# Patient Record
Sex: MALE | NOT HISPANIC OR LATINO | Employment: FULL TIME | URBAN - METROPOLITAN AREA
[De-identification: names, ages, dates, MRNs, and addresses within clinical notes are randomized per-mention and may not be internally consistent; named-entity substitution may affect disease eponyms.]

---

## 2017-11-14 ENCOUNTER — HOSPITAL ENCOUNTER (OUTPATIENT)
Facility: MEDICAL CENTER | Age: 57
End: 2017-11-14
Attending: OPHTHALMOLOGY | Admitting: OPHTHALMOLOGY
Payer: COMMERCIAL

## 2017-11-14 VITALS
RESPIRATION RATE: 16 BRPM | DIASTOLIC BLOOD PRESSURE: 72 MMHG | OXYGEN SATURATION: 100 % | HEART RATE: 58 BPM | TEMPERATURE: 97 F | HEIGHT: 66 IN | SYSTOLIC BLOOD PRESSURE: 115 MMHG | BODY MASS INDEX: 21.97 KG/M2 | WEIGHT: 136.69 LBS

## 2017-11-14 PROCEDURE — 160029 HCHG SURGERY MINUTES - 1ST 30 MINS LEVEL 4: Performed by: OPHTHALMOLOGY

## 2017-11-14 PROCEDURE — 700111 HCHG RX REV CODE 636 W/ 250 OVERRIDE (IP)

## 2017-11-14 PROCEDURE — 160036 HCHG PACU - EA ADDL 30 MINS PHASE I: Performed by: OPHTHALMOLOGY

## 2017-11-14 PROCEDURE — 160002 HCHG RECOVERY MINUTES (STAT): Performed by: OPHTHALMOLOGY

## 2017-11-14 PROCEDURE — 160035 HCHG PACU - 1ST 60 MINS PHASE I: Performed by: OPHTHALMOLOGY

## 2017-11-14 PROCEDURE — 160041 HCHG SURGERY MINUTES - EA ADDL 1 MIN LEVEL 4: Performed by: OPHTHALMOLOGY

## 2017-11-14 PROCEDURE — 500291 HCHG CAUTERY, OPTHTHALMIC: Performed by: OPHTHALMOLOGY

## 2017-11-14 PROCEDURE — 700101 HCHG RX REV CODE 250

## 2017-11-14 PROCEDURE — 160048 HCHG OR STATISTICAL LEVEL 1-5: Performed by: OPHTHALMOLOGY

## 2017-11-14 PROCEDURE — A6410 STERILE EYE PAD: HCPCS | Performed by: OPHTHALMOLOGY

## 2017-11-14 PROCEDURE — 500882 HCHG PACK, EYE CUSTOM CATARACT: Performed by: OPHTHALMOLOGY

## 2017-11-14 PROCEDURE — 500791 HCHG KNIFE, SLIT: Performed by: OPHTHALMOLOGY

## 2017-11-14 PROCEDURE — 500558 HCHG EYE SHIELD W/GARTER (FOX): Performed by: OPHTHALMOLOGY

## 2017-11-14 PROCEDURE — 501425 HCHG SPONGE, WECKCEL SPEAR: Performed by: OPHTHALMOLOGY

## 2017-11-14 PROCEDURE — 502000 HCHG MISC OR IMPLANTS RC 0278: Performed by: OPHTHALMOLOGY

## 2017-11-14 PROCEDURE — 502646 HCHG SEALANT-FIBRIN EVICEL 2 ML: Performed by: OPHTHALMOLOGY

## 2017-11-14 PROCEDURE — 99152 MOD SED SAME PHYS/QHP 5/>YRS: CPT | Performed by: OPHTHALMOLOGY

## 2017-11-14 PROCEDURE — 99153 MOD SED SAME PHYS/QHP EA: CPT | Performed by: OPHTHALMOLOGY

## 2017-11-14 DEVICE — TISSEEL, 2.0ML: Type: IMPLANTABLE DEVICE | Site: EYE | Status: FUNCTIONAL

## 2017-11-14 RX ORDER — MULTIVIT WITH MINERALS/LUTEIN
TABLET ORAL
COMMUNITY

## 2017-11-14 RX ORDER — TETRACAINE HYDROCHLORIDE 5 MG/ML
SOLUTION OPHTHALMIC
Status: DISCONTINUED | OUTPATIENT
Start: 2017-11-14 | End: 2017-11-14 | Stop reason: HOSPADM

## 2017-11-14 RX ORDER — LIDOCAINE HCL/EPINEPHRINE/PF 2%-1:200K
VIAL (ML) INJECTION
Status: DISCONTINUED
Start: 2017-11-14 | End: 2017-11-14 | Stop reason: HOSPADM

## 2017-11-14 RX ORDER — TETRACAINE HYDROCHLORIDE 5 MG/ML
SOLUTION OPHTHALMIC
Status: COMPLETED
Start: 2017-11-14 | End: 2017-11-14

## 2017-11-14 RX ORDER — LIDOCAINE HCL/EPINEPHRINE/PF 2%-1:200K
VIAL (ML) INJECTION
Status: DISCONTINUED | OUTPATIENT
Start: 2017-11-14 | End: 2017-11-14 | Stop reason: HOSPADM

## 2017-11-14 RX ORDER — SODIUM CHLORIDE, SODIUM LACTATE, POTASSIUM CHLORIDE, CALCIUM CHLORIDE 600; 310; 30; 20 MG/100ML; MG/100ML; MG/100ML; MG/100ML
INJECTION, SOLUTION INTRAVENOUS CONTINUOUS
Status: DISCONTINUED | OUTPATIENT
Start: 2017-11-14 | End: 2017-11-14 | Stop reason: HOSPADM

## 2017-11-14 RX ADMIN — SODIUM CHLORIDE, SODIUM LACTATE, POTASSIUM CHLORIDE, CALCIUM CHLORIDE: 600; 310; 30; 20 INJECTION, SOLUTION INTRAVENOUS at 09:40

## 2017-11-14 RX ADMIN — TETRACAINE HYDROCHLORIDE 3 DROP: 5 SOLUTION OPHTHALMIC at 09:20

## 2017-11-14 ASSESSMENT — PAIN SCALES - GENERAL
PAINLEVEL_OUTOF10: 0

## 2017-11-14 NOTE — OR NURSING
1141 PT ADM TO pacu - REPORT RECEIVED FROM RN AND ANESTHESIA - PT DENIES PAIN OR NAUSEA, OFFERED JUICE AND CRACKERS, LEFT EYE - DSD WITH EYE SHIELD ON - AMOS      *** discharged instructions reviewed with pt and marla.  All questions and concerns addressed    *** pt discharged to private car, ambulatory acc. By daughter

## 2017-11-14 NOTE — DISCHARGE INSTRUCTIONS
ACTIVITY: Rest and take it easy for the first 24 hours.  A responsible adult is recommended to remain with you during that time.  It is normal to feel sleepy.  We encourage you to not do anything that requires balance, judgment or coordination.    MILD FLU-LIKE SYMPTOMS ARE NORMAL. YOU MAY EXPERIENCE GENERALIZED MUSCLE ACHES, THROAT IRRITATION, HEADACHE AND/OR SOME NAUSEA.    FOR 24 HOURS DO NOT:  Drive, operate machinery or run household appliances.  Drink beer or alcoholic beverages.   Make important decisions or sign legal documents.    SPECIAL INSTRUCTIONS: PER MD'S INSTRUCTIONS - NO BENDING, STRAINING, STOOPING OR LIFTING  DO NOT USE ANY DROPS UNTIL OK'ED BY MD  DIET: To avoid nausea, slowly advance diet as tolerated, avoiding spicy or greasy foods for the first day.  Add more substantial food to your diet according to your physician's instructions.  Babies can be fed formula or breast milk as soon as they are hungry.  INCREASE FLUIDS AND FIBER TO AVOID CONSTIPATION.    SURGICAL DRESSING/BATHING: PER MD'S INSTRUCTIONS - KEEP THE DRESSINGS CLEAN AND DRY UNTIL SEEN BY MD    FOLLOW-UP APPOINTMENT:  A follow-up appointment should be arranged with your doctor in TOMORROW; call to schedule.    You should CALL YOUR PHYSICIAN if you develop:  Fever greater than 101 degrees F.  Pain not relieved by medication, or persistent nausea or vomiting.  Excessive bleeding (blood soaking through dressing) or unexpected drainage from the wound.  Extreme redness or swelling around the incision site, drainage of pus or foul smelling drainage.  Inability to urinate or empty your bladder within 8 hours.  Problems with breathing or chest pain.    You should call 911 if you develop problems with breathing or chest pain.  If you are unable to contact your doctor or surgical center, you should go to the nearest emergency room or urgent care center.  Physician's telephone #: [phone]    If any questions arise, call your doctor.  If your  doctor is not available, please feel free to call the Surgical Center at [phone].  The Center is open Monday through Friday from 7AM to 7PM.  You can also call the HEALTH HOTLINE open 24 hours/day, 7 days/week and speak to a nurse at [phone], or toll free at [phone].    A registered nurse may call you a few days after your surgery to see how you are doing after your procedure.    MEDICATIONS: Resume taking daily medication.  Take prescribed pain medication with food.  If no medication is prescribed, you may take non-aspirin pain medication if needed.  PAIN MEDICATION CAN BE VERY CONSTIPATING.  Take a stool softener or laxative such as senokot, pericolace, or milk of magnesia if needed.    Prescription given for VICODIN.  Last pain medication given at NA.    If your physician has prescribed pain medication that includes Acetaminophen (Tylenol), do not take additional Acetaminophen (Tylenol) while taking the prescribed medication.    Depression / Suicide Risk    As you are discharged from this [facility] facility, it is important to learn how to keep safe from harming yourself.    Recognize the warning signs:  · Abrupt changes in personality, positive or negative- including increase in energy   · Giving away possessions  · Change in eating patterns- significant weight changes-  positive or negative  · Change in sleeping patterns- unable to sleep or sleeping all the time   · Unwillingness or inability to communicate  · Depression  · Unusual sadness, discouragement and loneliness  · Talk of wanting to die  · Neglect of personal appearance   · Rebelliousness- reckless behavior  · Withdrawal from people/activities they love  · Confusion- inability to concentrate     If you or a loved one observes any of these behaviors or has concerns about self-harm, here's what you can do:  · Talk about it- your feelings and reasons for harming yourself  · Remove any means that you might use to hurt yourself  (examples: pills, rope, extension cords, firearm)  · Get professional help from the community (Mental Health, Substance Abuse, psychological counseling)  · Do not be alone:Call your Safe Contact- someone whom you trust who will be there for you.  · Call your local CRISIS HOTLINE 318-5005 or 534-463-8531  · Call your local Children's Mobile Crisis Response Team [place] (224) 018-2987 or www.[employer]  · Call the toll free National Suicide Prevention Hotlines   · National Suicide Prevention Lifeline 556-841-LDSG (6852)  · National Hope Line Network 800-SUICIDE (089-7997)

## 2017-11-15 NOTE — OP REPORT
DATE OF SERVICE:  11/14/2017    PROCEDURE PERFORMED:  Pterygium excision with the placement of amniotic   membrane graft, left eye.    PREOPERATIVE DIAGNOSIS:  Left eye pterygium.    POSTOPERATIVE DIAGNOSIS:  Left eye pterygium.    INDICATIONS FOR SURGERY:  This is a patient with a visually significant large   nasal left eye pterygium requiring excision for improvement in the visual   function.    ANESTHESIA:  Topical and monitored.    COMPLICATIONS:  None.    BLOOD LOSS:  Minimal.    SPECIMENS:  None.    DESCRIPTION OF PROCEDURE:  Risks, benefits, alternatives and indications for   surgery were reviewed with the patient preoperatively, and informed consent   was obtained.  Patient was brought to the operating room where the left eye   was prepped and draped and a lid speculum placed.  A large nasal pterygium was   observed and superior 6-0 Vicryl traction suture placed at the limbus and the   eye rotated outwardly.  The pterygium was injected with lidocaine and   epinephrine and then removed in total using Heath scissors and 0.12 forceps   down to bare sclera.  Hemostasis of the scleral bed was achieved using light   cautery.  The head of the pterygium was removed from the cornea using a   crescent blade as well as a rotating shakira dusted bur to smooth the nasal   peripheral and mid peripheral cornea.  Attention was then brought to the   amniotic membrane tissue, which was trimmed to the appropriate size, slid from   its base and then fixated on the bare scleral defect using Tisseel fibrin   glue.  The edges of the graft were tucked underneath native conjunctiva, which   was also reapproximated using additional fibrin glue as well as 2 interrupted   8-0 Vicryl sutures.  At the end of the case, the patient was given lidocaine   gel and TobraDex ointment and the eye firmly patched and shielded and the   patient discharged to the postanesthesia care unit in stable condition.        ____________________________________     MD VANNESA Soni / MONTRELL    DD:  11/14/2017 16:36:48  DT:  11/14/2017 16:51:28    D#:  [id number]  Job#:  [id number]

## 2018-02-26 ENCOUNTER — NON-PROVIDER VISIT (OUTPATIENT)
Dept: URGENT CARE | Facility: PHYSICIAN GROUP | Age: 58
End: 2018-02-26

## 2018-02-26 DIAGNOSIS — Z02.1 PRE-EMPLOYMENT DRUG SCREENING: ICD-10-CM

## 2018-02-26 LAB
AMP AMPHETAMINE: NEGATIVE
COC COCAINE: NEGATIVE
INT CON NEG: NEGATIVE
INT CON POS: POSITIVE
MET METHAMPHETAMINES: NEGATIVE
OPI OPIATES: NEGATIVE
PCP PHENCYCLIDINE: NEGATIVE
POC DRUG COMMENT 753798-OCCUPATIONAL HEALTH: NORMAL
THC: NEGATIVE

## 2018-02-26 PROCEDURE — 80305 DRUG TEST PRSMV DIR OPT OBS: CPT | Performed by: PHYSICIAN ASSISTANT

## 2018-09-27 ENCOUNTER — NON-PROVIDER VISIT (OUTPATIENT)
Dept: URGENT CARE | Facility: PHYSICIAN GROUP | Age: 58
End: 2018-09-27

## 2018-09-27 DIAGNOSIS — Z02.1 PRE-EMPLOYMENT DRUG SCREENING: ICD-10-CM

## 2018-09-27 LAB
AMP AMPHETAMINE: NORMAL
COC COCAINE: NORMAL
INT CON NEG: NORMAL
INT CON POS: NORMAL
MET METHAMPHETAMINES: NORMAL
OPI OPIATES: NORMAL
PCP PHENCYCLIDINE: NORMAL
POC DRUG COMMENT 753798-OCCUPATIONAL HEALTH: NEGATIVE
THC: NORMAL

## 2018-09-27 PROCEDURE — 80305 DRUG TEST PRSMV DIR OPT OBS: CPT | Performed by: PHYSICIAN ASSISTANT

## 2018-12-03 ENCOUNTER — OCCUPATIONAL MEDICINE (OUTPATIENT)
Dept: URGENT CARE | Facility: PHYSICIAN GROUP | Age: 58
End: 2018-12-03
Payer: OTHER MISCELLANEOUS

## 2018-12-03 VITALS
DIASTOLIC BLOOD PRESSURE: 74 MMHG | HEART RATE: 94 BPM | BODY MASS INDEX: 22.5 KG/M2 | SYSTOLIC BLOOD PRESSURE: 120 MMHG | WEIGHT: 140 LBS | HEIGHT: 66 IN | TEMPERATURE: 97.6 F | OXYGEN SATURATION: 97 %

## 2018-12-03 DIAGNOSIS — S46.912A STRAIN OF LEFT SHOULDER, INITIAL ENCOUNTER: ICD-10-CM

## 2018-12-03 DIAGNOSIS — S56.912A FOREARM STRAIN, LEFT, INITIAL ENCOUNTER: ICD-10-CM

## 2018-12-03 DIAGNOSIS — S50.812A ABRASION OF LEFT FOREARM, INITIAL ENCOUNTER: ICD-10-CM

## 2018-12-03 DIAGNOSIS — Z02.1 PRE-EMPLOYMENT DRUG SCREENING: ICD-10-CM

## 2018-12-03 LAB
AMP AMPHETAMINE: NORMAL
BREATH ALCOHOL COMMENT: NORMAL
COC COCAINE: NORMAL
INT CON NEG: NEGATIVE
INT CON POS: POSITIVE
MET METHAMPHETAMINES: NORMAL
OPI OPIATES: NORMAL
PCP PHENCYCLIDINE: NORMAL
POC BREATHALIZER: 0 PERCENT (ref 0–0.01)
POC DRUG COMMENT 753798-OCCUPATIONAL HEALTH: NEGATIVE
THC: NORMAL

## 2018-12-03 PROCEDURE — 80305 DRUG TEST PRSMV DIR OPT OBS: CPT | Performed by: NURSE PRACTITIONER

## 2018-12-03 PROCEDURE — 99204 OFFICE O/P NEW MOD 45 MIN: CPT | Mod: 29 | Performed by: NURSE PRACTITIONER

## 2018-12-03 PROCEDURE — 82075 ASSAY OF BREATH ETHANOL: CPT | Performed by: NURSE PRACTITIONER

## 2018-12-03 RX ORDER — IBUPROFEN 200 MG
200 TABLET ORAL EVERY 6 HOURS PRN
COMMUNITY

## 2018-12-03 ASSESSMENT — ENCOUNTER SYMPTOMS
WEAKNESS: 0
FEVER: 0
MUSCULOSKELETAL PAIN: 1
DIZZINESS: 0
NUMBNESS: 1
SORE THROAT: 0
JOINT SWELLING: 0
CHILLS: 0
MYALGIAS: 1
SHORTNESS OF BREATH: 0
NAUSEA: 0
EYE PAIN: 0
VOMITING: 0
FATIGUE: 0

## 2018-12-03 NOTE — LETTER
"EMPLOYEE’S CLAIM FOR COMPENSATION/ REPORT OF INITIAL TREATMENT  FORM C-4    EMPLOYEE’S CLAIM - PROVIDE ALL INFORMATION REQUESTED   First Name  Karine Last Name   Birthdate                    1960                Sex  male Claim Number   Home Address  [address]  Age  58 y.o. Height  1.676 m (5' 6\") Weight  63.5 kg (140 lb) [facility]     [place] Zip  [address] Telephone  [phone] (home)    Mailing Address  [address]  [place]  [address] Primary Language Spoken  English    Insurer Third Party   [employer]    Employee's Occupation (Job Title) When Injury or Occupational Disease Occurred  PRODUCTION     Employer's Name  [employer]  Telephone  [phone]    Employer Address  [address]  [place]  Zip  [address]    Date of Injury  11/30/2018               Hour of Injury  2:50 PM Date Employer Notified  12/3/2018 Last Day of Work after Injury or Occupational Disease  12/3/2018 Supervisor to Whom Injury Reported  DYLLAN   Address or Location of Accident (if applicable)  [[place]]   What were you doing at the time of accident? (if applicable)  I WAS CUTTING PLASTIC    How did this injury or occupational disease occur? (Be specific an answer in detail. Use additional sheet if necessary)  WHEN I WAS CUTTING PLASTIC   If you believe that you have an occupational disease, when did you first have knowledge of the disability and it relationship to your employment?  NA Witnesses to the Accident  DYLLAN       Nature of Injury or Occupational Disease  Laceration  Part(s) of Body Injured or Affected  Lower Arm (L), ,     I certify that the above is true and correct to the best of my knowledge and that I have provided this information in order to obtain the benefits of [place] " inclusive or [place]).  I hereby authorize any physician, chiropractor, surgeon, practitioner, or other person, any hospital, including [facility] hospital, any medical service organization, any insurance company, or other institution or organization to release to each other, any medical or other information, including benefits paid or payable, pertinent to this injury or disease, except information relative to diagnosis, treatment and/or counseling for AIDS, psychological conditions, alcohol or controlled substances, for which I must give specific authorization.  A Photostat of this authorization shall be as valid as the original.     Date 12/3/18   Place [facility]    Employee’s Signature   THIS REPORT MUST BE COMPLETED AND MAILED WITHIN 3 WORKING DAYS OF TREATMENT   Place  [facility] [place]  Name of Facility  [place]   Date  12/3/2018 Diagnosis  (S56.912A) Forearm strain, left, initial encounter  (S50.812A) Abrasion of left forearm, initial encounter  (S46.912A) Strain of left shoulder, initial encounter Is there evidence the injured employee was under the influence of alcohol and/or another controlled substance at the time of accident?   Hour  9:13 AM Description of Injury or Disease  Diagnoses of Forearm strain, left, initial encounter, Abrasion of left forearm, initial encounter, and Strain of left shoulder, initial encounter were pertinent to this visit. No   Treatment  Recommended relative rest, ice, nsaid prn.  Patient requesting topical ointment.  Will place patient on temporary work restrictions with limited use of the left arm, no fine hand manipulation,gripping,  weight limit of less than 10 pounds.  We will see patient back in 4 days for reevaluation.  Have you advised the patient to remain off work five days or more? No   X-Ray Findings      If Yes   From Date  To Date      From information given by the employee, together with medical  "evidence, can you directly connect this injury or occupational disease as job incurred?  Yes If No Full Duty  No Modified Duty  Yes   Is additional medical care by a physician indicated?  Yes If Modified Duty, Specify any Limitations / Restrictions  limited use of the left arm, no fine hand manipulation,gripping,  weight limit of less than 10 pounds   Do you know of any previous injury or disease contributing to this condition or occupational disease?                            No   Date  12/3/2018 Print Doctor’s Name HARISH Joseph I certify the employer’s copy of  this form was mailed on:   Address  [address] [place] [address]. [address] Insurer’s Use Only     [facility] Zip  [address]    Provider’s Tax ID Number  [id number] Telephone  Dept: [phone]        glenn-ELMER Fisher   e-Signature: Dr. Hong Escobar, Medical Director Degree  APRN        ORIGINAL-TREATING PHYSICIAN OR CHIROPRACTOR    PAGE 2-INSURER/TPA    PAGE 3-EMPLOYER    PAGE 4-EMPLOYEE             Form C-4 (rev10/07)              BRIEF DESCRIPTION OF RIGHTS AND BENEFITS  (Pursuant to [place]RS 616C.050)    Notice of Injury or Occupational Disease (Incident Report Form C-1): If an injury or occupational disease (OD) arises out of and in the  course of employment, you must provide written notice to your employer as soon as practicable, but no later than 7 days after the accident or  OD. Your employer shall maintain a sufficient supply of the required forms.    Claim for Compensation (Form C-4): If medical treatment is sought, the form C-4 is available at the place of initial treatment. A completed  \"Claim for Compensation\" (Form C-4) must be filed within 90 days after an accident or OD. The treating physician or chiropractor must,  within 3 working days after treatment, complete and mail to the employer, the employer's insurer and third-party , the Claim for  Compensation.    Medical Treatment: If you require " medical treatment for your on-the-job injury or OD, you may be required to select a physician or  chiropractor from a list provided by your workers’ compensation insurer, if it has contracted with an Organization for Managed Care (MCO) or  Preferred Provider Organization (PPO) or providers of health care. If your employer has not entered into a contract with an MCO or PPO, you  may select a physician or chiropractor from the Panel of Physicians and Chiropractors. Any medical costs related to your industrial injury or  OD will be paid by your insurer.    Temporary Total Disability (TTD): If your doctor has certified that you are unable to work for a period of at least 5 consecutive days, or 5  cumulative days in a 20-day period, or places restrictions on you that your employer does not accommodate, you may be entitled to TTD  compensation.    Temporary Partial Disability (TPD): If the wage you receive upon reemployment is less than the compensation for TTD to which you are  entitled, the insurer may be required to pay you TPD compensation to make up the difference. TPD can only be paid for a maximum of 24  months.    Permanent Partial Disability (PPD): When your medical condition is stable and there is an indication of a PPD as a result of your injury or  OD, within 30 days, your insurer must arrange for an evaluation by a rating physician or chiropractor to determine the degree of your PPD. The  amount of your PPD award depends on the date of injury, the results of the PPD evaluation and your age and wage.    Permanent Total Disability (PTD): If you are medically certified by a treating physician or chiropractor as permanently and totally disabled  and have been granted a PTD status by your insurer, you are entitled to receive monthly benefits not to exceed 66 2/3% of your average  monthly wage. The amount of your PTD payments is subject to reduction if you previously received a PPD award.    Vocational  Rehabilitation Services: You may be eligible for vocational rehabilitation services if you are unable to return to the job due to a  permanent physical impairment or permanent restrictions as a result of your injury or occupational disease.    Transportation and Per Rose Reimbursement: You may be eligible for travel expenses and per rose associated with medical treatment.    Reopening: You may be able to reopen your claim if your condition worsens after claim closure.    Appeal Process: If you disagree with a written determination issued by the insurer or the insurer does not respond to your request, you may  appeal to the Department of Administration, , by following the instructions contained in your determination letter. You must  appeal the determination within 70 days from the date of the determination letter at [address], [address], [place], [place]  [address], or [address], [place], [place] [address]. If you disagree with the  decision, you may appeal to the  Department of Administration, . You must file your appeal within 30 days from the date of the  decision  letter at [address], [address], [place], [place] [address], or [address], [place], [place] [address]. If you  disagree with a decision of an , you may file a petition for judicial review with the District Court. You must do so within 30  days of the Appeal Officer’s decision. You may be represented by an  at your own expense or you may contact the [facility] for possible  representation.    [place]  for Injured Workers (NAIW): If you disagree with a  decision, you may request that NAIW represent you  without charge at an  Hearing. For information regarding denial of benefits, you may contact the [facility] at: [address], [address], [place] [address], [phone],  or [address], [place] [address], [phone]    To File a Complaint with the Division: If you wish to file a complaint with the  of the Division of Industrial Relations (DIR),  please contact the Workers’ Compensation Section, [address], [address], [place], [place] [address], telephone [phone]) [phone], or  [address], [address], [place], [place] [address], telephone [phone].    For assistance with Workers’ Compensation Issues: you may contact the Office of the Governor Consumer Health Assistance, [address] [place] [address], [address], [place], [place] [address], Toll Free 1-570.565.2903, Web site: http://govcha.[facility].nv., E-mail  [email or web address].nv.                                                                                                                                                                                                                                   __________________________________________________________________                                                                   _________________                Employee Name / Signature                                                                                                                                                       Date                                                                                                                                                                                                     D-2 (rev. 10/07)

## 2018-12-03 NOTE — LETTER
[facility]  [address] - [place] [address]  Phone:  [phone] - Fax:  [phone]   [facility] Progress Report and Disability Certification  Date of Service: 12/3/2018   No Show:  No  Date / Time of Next Visit: 12/7/2018   Claim Information   Patient Name: Karine Anglin  Claim Number:     Employer: [employer]  Date of Injury: 11/30/2018     Insurer / TPA: [employer] Workers [employer] - Tpa  ID / SSN:     Occupation: PRODUCTION   Diagnosis: Diagnoses of Forearm strain, left, initial encounter, Abrasion of left forearm, initial encounter, and Strain of left shoulder, initial encounter were pertinent to this visit.    Medical Information   Related to Industrial Injury? Yes    Subjective Complaints:  DOI 11/30/18: Patient was cutting plastic with a knife and pulled very hard to cut and strained his left forearm and left shoulder.  Pain is worst at night if he is unable to sleep.  At this time he does not have any pain in the left shoulder.  He does have pain of the left forearm with tenderness on palpation.  He his having some numbness of left hand that is intermittent. He has been taking advil, and rubbing topical ointment with relief in symptoms.  He denies any previous injury to the left arm.  Denies second job.   Objective Findings: LEFT shoulder: FROM, no tenderness to palpation of anterior or posterior.  Rotator cuff appears to be intact.  Small superficial abrasion to left forearm, mild tenderness to palpation.  No swelling, no bruising.   strength 5/5. Sensation intact distally, Finkelstien negative.  To tenderness of lateral or medial epicondyles.  A&Ox4.    Pre-Existing Condition(s):     Assessment:   Initial Visit    Status: Additional Care Required  Permanent Disability:No    Plan: Medication    Diagnostics:      Comments:       Disability Information   Status: Released to Restricted Duty    From:  12/3/2018  Through:  2018 Restrictions are: Temporary   Physical Restrictions   Sitting:    Standing:    Stooping:    Bending:      Squatting:    Walking:    Climbing:    Pushing:      Pullin hrs/day Other:    Reaching Above Shoulder (L):   Reaching Above Shoulder (R):       Reaching Below Shoulder (L):    Reaching Below Shoulder (R):      Not to exceed Weight Limits   Carrying(hrs):   Weight Limit(lb): < or = to 10 pounds Lifting(hrs):   Weight  Limit(lb): < or = to 10 pounds   Comments: Recommended relative rest, ice, nsaid prn.  Patient requesting topical ointment.  Will place patient on temporary work restrictions with limited use of the left arm, no  fine hand manipulation, gripping, weight limit of less than 10 pounds.  We will see patient back in 4 days for reevaluation.      Repetitive Actions   Hands: i.e. Fine Manipulations from [place] hrs/day   Feet: i.e. Operating Foot Controls:     Driving / Operate Machinery:     Physician Name: HARISH Joseph Physician Signature: ELMER Peoples e-Signature: Dr. Hong Escobar, Medical Director   Clinic Name / Location: [address]  [place] [address] Clinic Phone Number: Dept: [phone]   Appointment Time: 8:55 Am Visit Start Time: 9:13 AM   Check-In Time:  8:59 Am Visit Discharge Time:  9:59 AM   Original-Treating Physician or Chiropractor    Page 2-Insurer/TPA    Page 3-Employer    Page 4-Employee

## 2018-12-03 NOTE — PROGRESS NOTES
Subjective:     Karine Anglin is a 58 y.o. male who presents for Muscle Pain (Lt arm, sharp pain that radiates to shoulder for 3 days. Small scrap to Lt  forearm. Pt states he have pulling and cutting heavy plastic when he cut his arm and strained hard to pull the plastic. )    DOI 11/30/18: Patient was cutting plastic with a knife and pulled very hard to cut and strained his left forearm and left shoulder.  Pain is worst at night if he is unable to sleep.  At this time he does not have any pain in the left shoulder.  He does have pain of the left forearm with tenderness on palpation.  He his having some numbness of left hand that is intermittent. He has been taking advil, and rubbing topical ointment with relief in symptoms.  He denies any previous injury to the left arm.  Denies second job.  Muscle Pain   This is a new problem. Episode onset: 4 days. The problem occurs constantly. The problem has been unchanged. Associated symptoms include myalgias (Left arm) and numbness (Intermittent). Pertinent negatives include no chest pain, chills, fatigue, fever, joint swelling, nausea, rash, sore throat, vomiting or weakness. Exacerbated by: Movement, lifting. He has tried NSAIDs and rest for the symptoms. The treatment provided mild relief.     Past Medical History:   Diagnosis Date   • Cataract     Left eye   • Urinary incontinence      Past Surgical History:   Procedure Laterality Date   • PTERYGIUM EXCISION Left 11/14/2017    Procedure: PTERYGIUM EXCISION W/AMNIOTIC MEMBRANE GRAFT;  Surgeon: Steve Hollins M.D.;  Location: SURGERY SAME DAY [facility];  Service: Ophthalmology   • OTHER      Right eye-pteryguim     Social History     Social History   • Marital status:      Spouse name: N/A   • Number of children: N/A   • Years of education: N/A     Occupational History   • Not on file.     Social History Main Topics   • Smoking status: Never Smoker   • Smokeless tobacco: Never Used   • Alcohol use No  "  • Drug use: No   • Sexual activity: Not on file     Other Topics Concern   • Not on file     Social History Narrative   • No narrative on file    History reviewed. No pertinent family history. Review of Systems   Constitutional: Negative for chills, fatigue and fever.   HENT: Negative for sore throat.    Eyes: Negative for pain.   Respiratory: Negative for shortness of breath.    Cardiovascular: Negative for chest pain.   Gastrointestinal: Negative for nausea and vomiting.   Genitourinary: Negative for hematuria.   Musculoskeletal: Positive for myalgias (Left arm). Negative for joint pain and joint swelling.   Skin: Negative for rash.   Neurological: Positive for numbness (Intermittent). Negative for dizziness and weakness.   No Known Allergies   Objective:   /74 (BP Location: Right arm, Patient Position: Sitting, BP Cuff Size: Adult)   Pulse 94   Temp 36.4 °C (97.6 °F) (Temporal)   Ht 1.676 m (5' 6\")   Wt 63.5 kg (140 lb)   SpO2 97%   BMI 22.60 kg/m²   Physical Exam   Constitutional: He is oriented to person, place, and time. He appears well-developed and well-nourished. No distress.   HENT:   Head: Normocephalic and atraumatic.   Eyes: Pupils are equal, round, and reactive to light. Conjunctivae and EOM are normal.   Cardiovascular: Normal rate and regular rhythm.    No murmur heard.  Pulmonary/Chest: Effort normal and breath sounds normal. No respiratory distress.   Abdominal: Soft. He exhibits no distension. There is no tenderness.   Musculoskeletal:        Left shoulder: He exhibits normal range of motion, no tenderness, no pain and no spasm.        Left elbow: He exhibits normal range of motion and no swelling. No tenderness found. No medial epicondyle and no lateral epicondyle tenderness noted.        Left forearm: He exhibits tenderness. He exhibits no bony tenderness, no swelling and no laceration.        Arms:  Neurological: He is alert and oriented to person, place, and time. He has normal " reflexes. No sensory deficit.   Skin: Skin is warm and dry.   Psychiatric: He has a normal mood and affect.     LEFT shoulder: FROM, no tenderness to palpation of anterior or posterior.  Rotator cuff appears to be intact.  Small superficial abrasion to left forearm, mild tenderness to palpation.  No swelling, no bruising.   strength 5/5. Sensation intact distally, Finkelstien negative.  To tenderness of lateral or medial epicondyles.  A&Ox4.    Assessment/Plan:   Assessment    1. Forearm strain, left, initial encounter  Diclofenac Sodium 1 % Gel   2. Abrasion of left forearm, initial encounter     3. Strain of left shoulder, initial encounter  Diclofenac Sodium 1 % Gel   Recommended relative rest, ice, nsaid prn.  Patient requesting topical ointment.  Will place patient on temporary work restrictions with limited use of the left arm, no  fine hand manipulation, gripping, weight limit of less than 10 pounds.  We will see patient back in 4 days for reevaluation.  Differential diagnosis, natural history, supportive care, and indications for immediate follow-up discussed.

## 2018-12-07 ENCOUNTER — OCCUPATIONAL MEDICINE (OUTPATIENT)
Dept: URGENT CARE | Facility: PHYSICIAN GROUP | Age: 58
End: 2018-12-07
Payer: OTHER MISCELLANEOUS

## 2018-12-07 VITALS
SYSTOLIC BLOOD PRESSURE: 122 MMHG | HEART RATE: 69 BPM | OXYGEN SATURATION: 97 % | TEMPERATURE: 98.6 F | WEIGHT: 142.8 LBS | HEIGHT: 66 IN | DIASTOLIC BLOOD PRESSURE: 76 MMHG | BODY MASS INDEX: 22.95 KG/M2

## 2018-12-07 DIAGNOSIS — S46.912D MUSCLE STRAIN OF LEFT UPPER ARM, SUBSEQUENT ENCOUNTER: ICD-10-CM

## 2018-12-07 DIAGNOSIS — S56.912D FOREARM STRAIN, LEFT, SUBSEQUENT ENCOUNTER: ICD-10-CM

## 2018-12-07 PROCEDURE — 99213 OFFICE O/P EST LOW 20 MIN: CPT | Mod: 29 | Performed by: NURSE PRACTITIONER

## 2018-12-07 ASSESSMENT — ENCOUNTER SYMPTOMS
CHILLS: 0
MYALGIAS: 1
TINGLING: 0
BRUISES/BLEEDS EASILY: 0
WEAKNESS: 0
FEVER: 0
SENSORY CHANGE: 0

## 2018-12-07 NOTE — LETTER
[facility]  [address] [place] [address]. [address] - [place] [address]  Phone:  [phone] - Fax:  [phone]   [facility] Progress Report and Disability Certification  Date of Service: 12/7/2018   No Show:  No  Date / Time of Next Visit: 12/14/2018   Claim Information   Patient Name: Karine Anglin  Claim Number:     Employer: [employer]  Date of Injury: 11/30/2018     Insurer / TPA: [employer] Workers [employer] - Tpa  ID / SSN:     Occupation: PRODUCTION   Diagnosis: Diagnoses of Forearm strain, left, subsequent encounter and Muscle strain of left upper arm, subsequent encounter were pertinent to this visit.    Medical Information   Related to Industrial Injury? Yes    Subjective Complaints:  DOI 11/30/18. Strained left forearm when he was cutting plastic with knife. Abrasion to left forearm healed well. No c/o left shoulder pain. C/o of mild upper bicep pain especially with cold temperatures, states will go into steam shower at gym and muscle pain will go away. Taking Advil only at bed time after work. Denies numbness.tingling in left hand/arm. Denies weakness in left arm. States never received Diclofenac gel from previous visit.   Objective Findings: A/O x 4. Skin p/w/d, skin sensation intact. Well healed abrasion to left forearm. No TTP at this site. Equal arm strength. No muscle tenderness at bicep. No swelling, muscle bulging or bruising seen. FROM of elbow and shoulder joints.    Pre-Existing Condition(s):     Assessment:   Condition Improved    Status: Additional Care Required  Permanent Disability:No    Plan:   Comments:Diclofenac called into [facility] pharmacy during exam today.    Diagnostics:      Comments:       Disability Information   Status: Released to Restricted Duty    From:  12/7/2018  Through: 12/14/2018 Restrictions are: Temporary   Physical Restrictions   Sitting:    Standing:    Stooping:    Bending:      Squatting:    Walking:   Climbing:    Pushing:      Pulling:    Other:    Reaching Above Shoulder (L):   Reaching Above Shoulder (R):       Reaching Below Shoulder (L):    Reaching Below Shoulder (R):      Not to exceed Weight Limits   Carrying(hrs):   Weight Limit(lb): < or = to 10 pounds Lifting(hrs):   Weight  Limit(lb): < or = to 10 pounds   Comments: May continue Advil at bedtime prn, may use Diclofenac as directed to affected arm region, may continue to use steam as muscle relaxant. Recheck on 12/14/18.    Repetitive Actions   Hands: i.e. Fine Manipulations from Grasping:     Feet: i.e. Operating Foot Controls:     Driving / Operate Machinery:     Physician Name: ANN Vega Physician Signature: NUPUR Farrell e-Signature: Dr. Hong Escobar, Medical Director   Clinic Name / Location: [address]  [place] [address] Clinic Phone Number: Dept: [phone]   Appointment Time: 4:00 Pm Visit Start Time: 4:14 PM   Check-In Time:  3:54 Pm Visit Discharge Time:  5:28 PM    Original-Treating Physician or Chiropractor    Page 2-Insurer/TPA    Page 3-Employer    Page 4-Employee

## 2018-12-08 NOTE — PROGRESS NOTES
Subjective:      Karine Anglin is a 58 y.o. male who presents with Arm Injury ((laceration) workman's comp follow up. Pt states that they feel a little better, still minor pain present.)      DOI 11/30/18. Strained left forearm when he was cutting plastic with knife. Abrasion to left forearm healed well. No c/o left shoulder pain. C/o of mild upper bicep pain especially with cold temperatures, states will go into steam shower at gym and muscle pain will go away. Taking Advil only at bed time after work. Denies numbness.tingling in left hand/arm. Denies weakness in left arm. States never received Diclofenac gel from previous visit.     HPI  See above   PMH:  has a past medical history of Cataract and Urinary incontinence.  MEDS:   Current Outpatient Prescriptions:   •  Diclofenac Sodium 1 % Gel, Apply 1 Application to skin as directed 3 times a day., Disp: 1 Tube, Rfl: 0  •  NON SPECIFIED, Protein supplement, Disp: , Rfl:   •  ibuprofen (MOTRIN) 200 MG Tab, Take 200 mg by mouth every 6 hours as needed., Disp: , Rfl:   •  Omega-3 Fatty Acids (OMEGA 3 PO), Take  by mouth., Disp: , Rfl:   •  Ascorbic Acid (VITAMIN C) 1000 MG Tab, Take  by mouth., Disp: , Rfl:   ALLERGIES: No Known Allergies  SURGHX:   Past Surgical History:   Procedure Laterality Date   • PTERYGIUM EXCISION Left 11/14/2017    Procedure: PTERYGIUM EXCISION W/AMNIOTIC MEMBRANE GRAFT;  Surgeon: Steve Hollins M.D.;  Location: SURGERY SAME DAY [facility];  Service: Ophthalmology   • OTHER      Right eye-pteryguim     SOCHX:  reports that he has never smoked. He has never used smokeless tobacco. He reports that he does not drink alcohol or use drugs.  FH: Family history was reviewed, no pertinent findings to report    Review of Systems   Constitutional: Negative for chills, fever and malaise/fatigue.   Musculoskeletal: Positive for myalgias.   Neurological: Negative for tingling, sensory change and weakness.   Endo/Heme/Allergies: Does not  "bruise/bleed easily.   All other systems reviewed and are negative.         Objective:     /76 (BP Location: Left arm, Patient Position: Sitting, BP Cuff Size: Adult)   Pulse 69   Temp 37 °C (98.6 °F) (Temporal)   Ht 1.676 m (5' 6\")   Wt 64.8 kg (142 lb 12.8 oz)   SpO2 97%   BMI 23.05 kg/m²      Physical Exam   Constitutional: He is oriented to person, place, and time. Vital signs are normal. He appears well-developed and well-nourished. He is active and cooperative.  Non-toxic appearance. He does not have a sickly appearance. He does not appear ill. No distress.   HENT:   Head: Normocephalic.   Cardiovascular: Normal rate.    Pulmonary/Chest: Effort normal.   Musculoskeletal:        Left upper arm: He exhibits no tenderness, no bony tenderness, no swelling, no edema, no deformity and no laceration.        Left forearm: He exhibits no tenderness, no bony tenderness, no swelling, no edema, no deformity and no laceration.        Arms:  Neurological: He is alert and oriented to person, place, and time.   Skin: Skin is warm and dry. No rash noted. He is not diaphoretic. No erythema.   Vitals reviewed.      A/O x 4. Skin p/w/d, skin sensation intact. Well healed abrasion to left forearm. No TTP at this site. Equal arm strength. No muscle tenderness at bicep. No swelling, muscle bulging or bruising seen. FROM of elbow and shoulder joints.        Assessment/Plan:     1. Forearm strain, left, subsequent encounter    2. Muscle strain of left upper arm, subsequent encounter    -ANTONIETA Burns, called in Diclofenac 1% gel to [facility] pharmacy, patient stated did not receive this med from [facility] on initial visit, when [facility] called, states out of this but did not call patient or clinic of this status  -May continue Advil at bedtime prn, may use Diclofenac as directed to affected arm region, may continue to use steam as muscle relaxant. Recheck on 12/14/18.      "

## 2018-12-14 ENCOUNTER — OCCUPATIONAL MEDICINE (OUTPATIENT)
Dept: URGENT CARE | Facility: PHYSICIAN GROUP | Age: 58
End: 2018-12-14
Payer: OTHER MISCELLANEOUS

## 2018-12-14 VITALS
HEART RATE: 77 BPM | SYSTOLIC BLOOD PRESSURE: 116 MMHG | OXYGEN SATURATION: 97 % | HEIGHT: 66 IN | DIASTOLIC BLOOD PRESSURE: 72 MMHG | RESPIRATION RATE: 15 BRPM | WEIGHT: 142 LBS | TEMPERATURE: 97.5 F | BODY MASS INDEX: 22.82 KG/M2

## 2018-12-14 DIAGNOSIS — S56.912A FOREARM STRAIN, LEFT, INITIAL ENCOUNTER: ICD-10-CM

## 2018-12-14 PROCEDURE — 99213 OFFICE O/P EST LOW 20 MIN: CPT | Mod: 29 | Performed by: PHYSICIAN ASSISTANT

## 2018-12-14 NOTE — LETTER
[facility]  [address] [place] [address]. [address] - [place] [address]  Phone:  [phone] - Fax:  [phone]   [facility] Progress Report and Disability Certification  Date of Service: 12/14/2018   No Show:  No  Date / Time of Next Visit:     Claim Information   Patient Name: Karine Anglin  Claim Number:     Employer: [employer]  Date of Injury: 11/30/2018     Insurer / TPA: [employer] Workers [employer] - Tpa  ID / SSN:     Occupation: PRODUCTION   Diagnosis: The encounter diagnosis was Forearm strain, left, initial encounter.    Medical Information   Related to Industrial Injury?      Subjective Complaints:  DOI: 11/30/18. Left forearm strain. Much improved. Using diclofenac gel with good relief. Tolerating full duty. No new sxs or concerns today.   Objective Findings: No forearm tenderness good range of motion in left shoulder, elbow, wrist within normal limits.  No bruising, swelling, deformity seen.  Strength equal.  Exam essentially normal   Pre-Existing Condition(s):     Assessment:   Condition Improved    Status: Discharged /  MMI  Permanent Disability:No    Plan:      Diagnostics:      Comments:       Disability Information   Status: Released to Full Duty    From:     Through:   Restrictions are:     Physical Restrictions   Sitting:    Standing:    Stooping:    Bending:      Squatting:    Walking:    Climbing:    Pushing:      Pulling:    Other:    Reaching Above Shoulder (L):   Reaching Above Shoulder (R):       Reaching Below Shoulder (L):    Reaching Below Shoulder (R):      Not to exceed Weight Limits   Carrying(hrs):   Weight Limit(lb):   Lifting(hrs):   Weight  Limit(lb):     Comments:      Repetitive Actions   Hands: i.e. Fine Manipulations from Grasping:     Feet: i.e. Operating Foot Controls:     Driving / Operate Machinery:     Physician Name: Castillo Cisneros P.A.-C. Physician Signature: CASTILLO Gruber P.A.-C. e-Signature:   Hong Escobar, Medical Director   Clinic Name / Location: [address]  [place] [address] Clinic Phone Number: Dept: [phone]   Appointment Time: 4:05 Pm Visit Start Time: 4:08 PM   Check-In Time:  4:01 Pm Visit Discharge Time:  5:00 PM   Original-Treating Physician or Chiropractor    Page 2-Insurer/TPA    Page 3-Employer    Page 4-Employee

## 2018-12-15 NOTE — PROGRESS NOTES
"Subjective:      Karine Anglin is a 58 y.o. male who presents with Laceration (WC follow up, left arm lac)      DOI: 11/30/18. Left forearm strain. Much improved. Using diclofenac gel with good relief. Tolerating full duty. No new sxs or concerns today.     HPI    ROS    Medications, Allergies, and current problem list reviewed today in Epic     Objective:     /72   Pulse 77   Temp 36.4 °C (97.5 °F) (Temporal)   Resp 15   Ht 1.676 m (5' 6\")   Wt 64.4 kg (142 lb)   SpO2 97%   BMI 22.92 kg/m²      Physical Exam   Constitutional: He is oriented to person, place, and time. He appears well-developed and well-nourished. No distress.   Neurological: He is alert and oriented to person, place, and time.   Skin: Skin is warm and dry. He is not diaphoretic.   Psychiatric: He has a normal mood and affect. His behavior is normal. Judgment and thought content normal.   Nursing note and vitals reviewed.      No forearm tenderness good range of motion in left shoulder, elbow, wrist within normal limits.  No bruising, swelling, deformity seen.  Strength equal.  Exam essentially normal       Assessment/Plan:     1. Forearm strain, left, initial encounter       No further pain.  No concerns or complaints today.  Exam normal.  Tolerating full duty.  Discharge MMI    Please note that this dictation was created using voice recognition software. I have made every reasonable attempt to correct obvious errors, but I expect that there are errors of grammar and possibly content that I did not discover before finalizing the note.    "

## 2019-07-03 ENCOUNTER — OFFICE VISIT (OUTPATIENT)
Dept: URGENT CARE | Facility: PHYSICIAN GROUP | Age: 59
End: 2019-07-03

## 2019-07-03 DIAGNOSIS — Z02.1 PRE-EMPLOYMENT DRUG SCREENING: ICD-10-CM

## 2019-08-20 ENCOUNTER — NON-PROVIDER VISIT (OUTPATIENT)
Dept: URGENT CARE | Facility: PHYSICIAN GROUP | Age: 59
End: 2019-08-20

## 2019-08-20 DIAGNOSIS — Z02.1 PRE-EMPLOYMENT DRUG SCREENING: ICD-10-CM

## 2019-08-20 LAB
AMP AMPHETAMINE: NORMAL
COC COCAINE: NORMAL
INT CON NEG: NORMAL
INT CON POS: NORMAL
MET METHAMPHETAMINES: NORMAL
OPI OPIATES: NORMAL
PCP PHENCYCLIDINE: NORMAL
POC DRUG COMMENT 753798-OCCUPATIONAL HEALTH: NORMAL
THC: NORMAL

## 2019-08-20 PROCEDURE — 80305 DRUG TEST PRSMV DIR OPT OBS: CPT | Performed by: PHYSICIAN ASSISTANT

## 2019-09-03 ENCOUNTER — NON-PROVIDER VISIT (OUTPATIENT)
Dept: URGENT CARE | Facility: PHYSICIAN GROUP | Age: 59
End: 2019-09-03

## 2019-09-03 DIAGNOSIS — Z02.1 PRE-EMPLOYMENT DRUG SCREENING: ICD-10-CM

## 2019-09-03 LAB
AMP AMPHETAMINE: NORMAL
COC COCAINE: NORMAL
INT CON NEG: NEGATIVE
INT CON POS: POSITIVE
MET METHAMPHETAMINES: NORMAL
OPI OPIATES: NORMAL
PCP PHENCYCLIDINE: NORMAL
POC DRUG COMMENT 753798-OCCUPATIONAL HEALTH: NEGATIVE
THC: NORMAL

## 2019-09-03 PROCEDURE — 80305 DRUG TEST PRSMV DIR OPT OBS: CPT | Performed by: PHYSICIAN ASSISTANT

## (undated) DEVICE — ELECTRODE 850 FOAM ADHESIVE - HYDROGEL RADIOTRNSPRNT (50/PK)

## (undated) DEVICE — CAUTERY OPHTH WETFLD 18GA STR (10EA/SP)

## (undated) DEVICE — SYRINGE 3 CC 21 GA X 1-1/2 - NDL SAFETY (50/BX 8BX/CA)

## (undated) DEVICE — PACK CATARACT GENERAL (4EA/CA)

## (undated) DEVICE — SODIUM CHL IRRIGATION 0.9% 1000ML (12EA/CA)

## (undated) DEVICE — TUBING CLEARLINK DUO-VENT - C-FLO (48EA/CA)

## (undated) DEVICE — SPEAR EYE SPNG 3ANG MLBL HNDL - (10/ST18ST/PK 180/PK 1PK/SP)

## (undated) DEVICE — CANNULA W/ SUPPLY TUBING O2 - (50/CA)

## (undated) DEVICE — LACTATED RINGERS INJ 1000 ML - (14EA/CA 60CA/PF)

## (undated) DEVICE — WATER IRRIGATION STERILE 1000ML (12EA/CA)

## (undated) DEVICE — KNIFE CRESCENT ANGLED 55DEG - EYE (10/SP)

## (undated) DEVICE — SHIELD OPTH AL GRTR CVR FOX (50EA/BX)

## (undated) DEVICE — PAD EYE GAUZE COVERED OVAL 1 5/8 X 2 5/8" STERILE"

## (undated) DEVICE — KIT  I.V. START (100EA/CA)

## (undated) DEVICE — SET LEADWIRE 5 LEAD BEDSIDE DISPOSABLE ECG (1SET OF 5/EA)

## (undated) DEVICE — NEEDLE  NON-SAFETY 30GA X 3/4 IN (10EA/CA)

## (undated) DEVICE — CON SEDATION EA ADDL 15 MIN

## (undated) DEVICE — GLOVE BIOGEL SZ 7.5 SURGICAL PF LTX - (50PR/BX 4BX/CA)

## (undated) DEVICE — PACK BASIC CATARACT - (4/BX)

## (undated) DEVICE — SUTURE 8-0 COATED VICRYL TG160-8 (12PK/BX)

## (undated) DEVICE — DRAPE 36X28IN RAD CARM BND BG - (25/CA) O

## (undated) DEVICE — SYRINGE SAFETY 3 ML 18 GA X 1 1/2 BLUNT LL (100/BX 8BX/CA)

## (undated) DEVICE — CATHETER IV 20 GA X 1-1/4 ---SURG.& SDS ONLY--- (50EA/BX)

## (undated) DEVICE — NEEDLE HYPODERMIC 27G X 1-1/4 - 100/BX

## (undated) DEVICE — SENSOR SPO2 NEO LNCS ADHESIVE (20/BX) SEE USER NOTES

## (undated) DEVICE — GLOVE BIOGEL PI INDICATOR SZ 7.5 SURGICAL PF LF -(50/BX 4BX/CA)

## (undated) DEVICE — CON SEDATION/>5 YR 1ST 15 MIN

## (undated) DEVICE — SET EXTENSION WITH 2 PORTS (48EA/CA) ***PART #2C8610 IS A SUBSTITUTE*****

## (undated) DEVICE — PEN SKIN MARKER W/RULER - (50EA/BX)